# Patient Record
Sex: MALE | Race: WHITE | NOT HISPANIC OR LATINO
[De-identification: names, ages, dates, MRNs, and addresses within clinical notes are randomized per-mention and may not be internally consistent; named-entity substitution may affect disease eponyms.]

---
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
## 2019-06-07 ENCOUNTER — NON-APPOINTMENT (OUTPATIENT)
Age: 19
End: 2019-06-07

## 2019-06-07 ENCOUNTER — APPOINTMENT (OUTPATIENT)
Dept: CARDIOLOGY | Facility: CLINIC | Age: 19
End: 2019-06-07
Payer: COMMERCIAL

## 2019-06-07 VITALS
HEIGHT: 66 IN | SYSTOLIC BLOOD PRESSURE: 120 MMHG | OXYGEN SATURATION: 98 % | DIASTOLIC BLOOD PRESSURE: 90 MMHG | HEART RATE: 65 BPM | BODY MASS INDEX: 28.61 KG/M2 | WEIGHT: 178 LBS

## 2019-06-07 DIAGNOSIS — Z82.49 FAMILY HISTORY OF ISCHEMIC HEART DISEASE AND OTHER DISEASES OF THE CIRCULATORY SYSTEM: ICD-10-CM

## 2019-06-07 PROCEDURE — 99214 OFFICE O/P EST MOD 30 MIN: CPT

## 2019-06-07 PROCEDURE — 93000 ELECTROCARDIOGRAM COMPLETE: CPT | Mod: 59

## 2019-06-07 PROCEDURE — 0296T: CPT

## 2019-06-07 RX ORDER — METOPROLOL SUCCINATE 50 MG/1
50 TABLET, EXTENDED RELEASE ORAL
Qty: 90 | Refills: 3 | Status: ACTIVE | COMMUNITY
Start: 2019-06-07 | End: 1900-01-01

## 2019-06-07 NOTE — REASON FOR VISIT
[Follow-Up - Clinic] : a clinic follow-up of [Hypertension] : hypertension [Medication Management] : Medication management [Parent] : parent

## 2019-06-07 NOTE — PHYSICAL EXAM
[General Appearance - Well Developed] : well developed [Well Groomed] : well groomed [General Appearance - Well Nourished] : well nourished [Normal Appearance] : normal appearance [No Deformities] : no deformities [General Appearance - In No Acute Distress] : no acute distress [Normal Conjunctiva] : the conjunctiva exhibited no abnormalities [No Oral Pallor] : no oral pallor [Eyelids - No Xanthelasma] : the eyelids demonstrated no xanthelasmas [Normal Oral Mucosa] : normal oral mucosa [No Oral Cyanosis] : no oral cyanosis [Normal Jugular Venous A Waves Present] : normal jugular venous A waves present [Normal Jugular Venous V Waves Present] : normal jugular venous V waves present [Respiration, Rhythm And Depth] : normal respiratory rhythm and effort [No Jugular Venous Joshi A Waves] : no jugular venous joshi A waves [Heart Rate And Rhythm] : heart rate and rhythm were normal [Exaggerated Use Of Accessory Muscles For Inspiration] : no accessory muscle use [Auscultation Breath Sounds / Voice Sounds] : lungs were clear to auscultation bilaterally [Heart Sounds] : normal S1 and S2 [Murmurs] : no murmurs present [Abdomen Soft] : soft [Abdomen Tenderness] : non-tender [Abdomen Mass (___ Cm)] : no abdominal mass palpated [Nail Clubbing] : no clubbing of the fingernails [Abnormal Walk] : normal gait [Gait - Sufficient For Exercise Testing] : the gait was sufficient for exercise testing [Cyanosis, Localized] : no localized cyanosis [Petechial Hemorrhages (___cm)] : no petechial hemorrhages [Skin Color & Pigmentation] : normal skin color and pigmentation [] : no rash [No Venous Stasis] : no venous stasis [No Xanthoma] : no  xanthoma was observed [No Skin Ulcers] : no skin ulcer [Skin Lesions] : no skin lesions [Affect] : the affect was normal [Oriented To Time, Place, And Person] : oriented to person, place, and time [No Anxiety] : not feeling anxious [Mood] : the mood was normal

## 2019-06-07 NOTE — REVIEW OF SYSTEMS
[Dizziness] : dizziness [Negative] : Heme/Lymph [Headache] : headache [see HPI] : see HPI [Abdominal Pain] : abdominal pain

## 2019-06-10 NOTE — ASSESSMENT
[FreeTextEntry1] : \par \par Palpitations\par Zio patch will be applied to rule out dysrhythmia\par Blood work has been requested\par Followup echocardiogram\par Start beta blocker therapy. Instructed to call if adverse effect.\par \par Discussed the diagnosis, natural history and pathophysiology of HTN.\par Discussed importance of long term BP control to reduce CV risk\par Reviewed recent guidelines and BP goals\par No secondary causes of hypertension by blood work. \par Previously seen nephrology and optho, also involve endo\par Evaluate other comorbid risk factors (eg, obesity and dyslipidemia) for cardiovascular disease (CVD) \par Echocardiogram without hypertensive heart disease\par Normal blood pressure response to exercise. \par \par Discussed indications for pharmacotherapy.\par Continue lisinopril hydrochlorothiazide.  Monitor electrolytes and renal function\par Instructed to call if adverse effects\par Adjunctive dietary measures: regular aerobic exercise, low-sodium diet, limit NSAIDs\par Monitor blood pressure at home.   Call if abnormal BP readings\par \par Weight loss for long-term cardiovascular health.\par \par Mixed dyslipidemia. Low HDL elevated triglycerides elevated ALT presumed steatohepatitis. \par Lifestyle measures.

## 2019-06-10 NOTE — HISTORY OF PRESENT ILLNESS
[FreeTextEntry1] : ROWENA OSBORN  is a 18 year old  M\par There is a significant family history of hypertension and coronary artery disease.\par \par Presents for off to the office with symptoms of palpitations. He reports having panic attacks while in school. He remains active and plays club lacrosse and bikes. Over the past several days. He had symptoms of palpitations described as heart beating out of his chest. There is associated headache and abdominal pain. His blood pressure was elevated at home. There have been recurrent episodes, though less severe than when they initially started at night a week ago. The symptoms are intermittent in nature. \par \par There is no prior history of a clinical myocardial infarction, coronary revascularization. \par There is no history of symptomatic congestive heart failure rheumatic heart disease or valvular disease.\par There is no history of symptomatic arrhythmias including atrial fibrillation.\par \par There is no exertional chest pain, pressure or discomfort. \par There is no significant dyspnea on exertion or orthopnea. \par There is no syncope.\par \par Renal ultrasound. 2.18. No stenosis.\par Exercise tolerance test. March 2018. Exercised 11 minutes. Normal blood pressure response to exercise.  Test notable for tachycardia. \par Blood work 2.18 Hemoglobin 15.2, potassium 4.6, creatinine 0.9, triglycerides 298, HDL 36, LDL 47 A1c 5.66, TSH 4.2, magnesium 2.2, catecholamines within normal limits elevated reninn activity likely related to ACE inhibitor\par \par EKG demonstrates normal sinus rhythm, possible LVH. Early repolarization\par

## 2019-06-18 PROCEDURE — 0298T: CPT

## 2019-07-02 ENCOUNTER — APPOINTMENT (OUTPATIENT)
Dept: CARDIOLOGY | Facility: CLINIC | Age: 19
End: 2019-07-02
Payer: COMMERCIAL

## 2019-07-02 PROCEDURE — 93306 TTE W/DOPPLER COMPLETE: CPT

## 2019-07-08 ENCOUNTER — APPOINTMENT (OUTPATIENT)
Dept: CARDIOLOGY | Facility: CLINIC | Age: 19
End: 2019-07-08
Payer: COMMERCIAL

## 2019-07-08 VITALS
HEART RATE: 99 BPM | DIASTOLIC BLOOD PRESSURE: 88 MMHG | SYSTOLIC BLOOD PRESSURE: 132 MMHG | WEIGHT: 185 LBS | OXYGEN SATURATION: 97 % | BODY MASS INDEX: 29.73 KG/M2 | HEIGHT: 66 IN

## 2019-07-08 PROCEDURE — 99214 OFFICE O/P EST MOD 30 MIN: CPT

## 2019-07-08 NOTE — ASSESSMENT
[FreeTextEntry1] : \par Palpitations improved\par Recent monitor with no dysrhythmia. \par Hypertensive heart disease. Normal left ventricular function. \par Chronically elevated diastolic blood pressure. \par Significant family history of hypertension\par Increase lisinopril- HCTZ. Followup blood work to monitor electrolytes. \par Contnue beta blocker therapy. Instructed to call if adverse effect.\par \par Discussed the diagnosis, natural history and pathophysiology of HTN.\par Discussed importance of long term BP control to reduce CV risk\par Reviewed recent guidelines and BP goals\par No secondary causes of hypertension by blood work. \par Previously seen nephrology and optho, also involve endo\par Evaluate other comorbid risk factors (eg, obesity and dyslipidemia) for cardiovascular disease (CVD) \par \par Adjunctive dietary measures: regular aerobic exercise, low-sodium diet, limit NSAIDs\par Monitor blood pressure at home.   Call if abnormal BP readings\par \par Weight loss for long-term cardiovascular health.\par \par Mixed dyslipidemia. Low HDL elevated triglycerides elevated ALT presumed steatohepatitis. \par Lifestyle measures.

## 2019-07-08 NOTE — REVIEW OF SYSTEMS
[Headache] : headache [Abdominal Pain] : abdominal pain [see HPI] : see HPI [Dizziness] : dizziness [Negative] : Heme/Lymph

## 2019-07-08 NOTE — PHYSICAL EXAM
[General Appearance - Well Developed] : well developed [Normal Appearance] : normal appearance [General Appearance - Well Nourished] : well nourished [Well Groomed] : well groomed [No Deformities] : no deformities [Normal Conjunctiva] : the conjunctiva exhibited no abnormalities [General Appearance - In No Acute Distress] : no acute distress [Normal Oral Mucosa] : normal oral mucosa [No Oral Pallor] : no oral pallor [Eyelids - No Xanthelasma] : the eyelids demonstrated no xanthelasmas [No Oral Cyanosis] : no oral cyanosis [Normal Jugular Venous A Waves Present] : normal jugular venous A waves present [Normal Jugular Venous V Waves Present] : normal jugular venous V waves present [No Jugular Venous Joshi A Waves] : no jugular venous joshi A waves [Respiration, Rhythm And Depth] : normal respiratory rhythm and effort [Exaggerated Use Of Accessory Muscles For Inspiration] : no accessory muscle use [Auscultation Breath Sounds / Voice Sounds] : lungs were clear to auscultation bilaterally [Heart Rate And Rhythm] : heart rate and rhythm were normal [Murmurs] : no murmurs present [Abdomen Soft] : soft [Abdomen Tenderness] : non-tender [Heart Sounds] : normal S1 and S2 [Abdomen Mass (___ Cm)] : no abdominal mass palpated [Abnormal Walk] : normal gait [Nail Clubbing] : no clubbing of the fingernails [Gait - Sufficient For Exercise Testing] : the gait was sufficient for exercise testing [Cyanosis, Localized] : no localized cyanosis [Petechial Hemorrhages (___cm)] : no petechial hemorrhages [Skin Color & Pigmentation] : normal skin color and pigmentation [No Venous Stasis] : no venous stasis [Skin Lesions] : no skin lesions [] : no rash [No Skin Ulcers] : no skin ulcer [No Xanthoma] : no  xanthoma was observed [Oriented To Time, Place, And Person] : oriented to person, place, and time [Mood] : the mood was normal [Affect] : the affect was normal [No Anxiety] : not feeling anxious

## 2019-07-08 NOTE — HISTORY OF PRESENT ILLNESS
[FreeTextEntry1] : ROWENA OSBORN  is a 18 year old  M\par There is a significant family history of hypertension and coronary artery disease.\par \par Recent symptoms of recurrent intermittent palpitations, described as heart beating out of his chest. He reports having panic attacks while in school. He remains active and plays club lacrosse and bikesHis blood pressure was elevated at home \par \par Last visit, started beta blocker.  Echocardiogram, bood work was requested. \par There is clinical improvement with the metoprolol. \par \par There is no prior history of a clinical myocardial infarction, coronary revascularization. \par There is no history of symptomatic congestive heart failure rheumatic heart disease or valvular disease.\par There is no history of symptomatic arrhythmias including atrial fibrillation.\par \par There is no exertional chest pain, pressure or discomfort. \par There is no significant dyspnea on exertion or orthopnea. \par There is no syncope.\par \par Blood work, July 2019, potassium 4.3, creatinine 0.9, total cholesterol 117, HDL 43, LDL 52, TSH 1.9, hemoglobin 17.0, A1c 5.5. \par Echocardiogram demonstrates normal left ventricular function, mild left ventricular hypertrophy no significant valve  disease \par Monitor demonstrates average heart rate of 90 rare ectopy, sinus tachycardia \par Renal ultrasound. 2.18. No stenosis.\par Exercise tolerance test. March 2018. Exercised 11 minutes. Normal blood pressure response to exercise.  Test notable for tachycardia. \par Blood work 2.18 Hemoglobin 15.2, potassium 4.6, creatinine 0.9, triglycerides 298, HDL 36, LDL 47 A1c 5.66, TSH 4.2, magnesium 2.2, catecholamines within normal limits elevated reninn activity likely related to ACE inhibitor\par EKG demonstrates normal sinus rhythm, possible LVH. Early repolarization\par \par

## 2019-08-08 ENCOUNTER — APPOINTMENT (OUTPATIENT)
Dept: CARDIOLOGY | Facility: CLINIC | Age: 19
End: 2019-08-08

## 2019-08-12 ENCOUNTER — APPOINTMENT (OUTPATIENT)
Dept: CARDIOLOGY | Facility: CLINIC | Age: 19
End: 2019-08-12
Payer: COMMERCIAL

## 2019-08-12 VITALS
HEIGHT: 66 IN | DIASTOLIC BLOOD PRESSURE: 80 MMHG | BODY MASS INDEX: 30.53 KG/M2 | OXYGEN SATURATION: 98 % | WEIGHT: 190 LBS | HEART RATE: 80 BPM | SYSTOLIC BLOOD PRESSURE: 112 MMHG

## 2019-08-12 DIAGNOSIS — Z87.898 PERSONAL HISTORY OF OTHER SPECIFIED CONDITIONS: ICD-10-CM

## 2019-08-12 DIAGNOSIS — I10 ESSENTIAL (PRIMARY) HYPERTENSION: ICD-10-CM

## 2019-08-12 DIAGNOSIS — R00.2 PALPITATIONS: ICD-10-CM

## 2019-08-12 DIAGNOSIS — E66.3 OVERWEIGHT: ICD-10-CM

## 2019-08-12 DIAGNOSIS — E78.2 MIXED HYPERLIPIDEMIA: ICD-10-CM

## 2019-08-12 PROCEDURE — 99213 OFFICE O/P EST LOW 20 MIN: CPT

## 2019-08-12 NOTE — PHYSICAL EXAM
[General Appearance - Well Developed] : well developed [Normal Appearance] : normal appearance [Well Groomed] : well groomed [General Appearance - Well Nourished] : well nourished [No Deformities] : no deformities [Normal Conjunctiva] : the conjunctiva exhibited no abnormalities [General Appearance - In No Acute Distress] : no acute distress [Eyelids - No Xanthelasma] : the eyelids demonstrated no xanthelasmas [No Oral Pallor] : no oral pallor [No Oral Cyanosis] : no oral cyanosis [Normal Jugular Venous A Waves Present] : normal jugular venous A waves present [Normal Jugular Venous V Waves Present] : normal jugular venous V waves present [No Jugular Venous Joshi A Waves] : no jugular venous joshi A waves [Respiration, Rhythm And Depth] : normal respiratory rhythm and effort [Auscultation Breath Sounds / Voice Sounds] : lungs were clear to auscultation bilaterally [Exaggerated Use Of Accessory Muscles For Inspiration] : no accessory muscle use [Heart Rate And Rhythm] : heart rate and rhythm were normal [Heart Sounds] : normal S1 and S2 [Edema] : no peripheral edema present [Murmurs] : no murmurs present [Abdomen Soft] : soft [Abdomen Tenderness] : non-tender [Abnormal Walk] : normal gait [Abdomen Mass (___ Cm)] : no abdominal mass palpated [Gait - Sufficient For Exercise Testing] : the gait was sufficient for exercise testing [Nail Clubbing] : no clubbing of the fingernails [Cyanosis, Localized] : no localized cyanosis [Petechial Hemorrhages (___cm)] : no petechial hemorrhages [Skin Color & Pigmentation] : normal skin color and pigmentation [No Venous Stasis] : no venous stasis [] : no rash [Skin Lesions] : no skin lesions [No Skin Ulcers] : no skin ulcer [No Xanthoma] : no  xanthoma was observed [Oriented To Time, Place, And Person] : oriented to person, place, and time [Affect] : the affect was normal [Mood] : the mood was normal [No Anxiety] : not feeling anxious

## 2019-08-12 NOTE — HISTORY OF PRESENT ILLNESS
[FreeTextEntry1] : ROWENA OSBORN  is a 18 year old  M\par There is a significant family history of hypertension and coronary artery disease.\par \par Initially presented with symptoms of recurrent intermittent palpitations, described as heart beating out of his chest. He reports having panic attacks while in school. He remains active and plays club lacrosse and bikes. His blood pressure was elevated at home.\par \par He was started on beta blocker.  \par There is clinical improvement in his symptoms but persistent elevated DBP.\par Increased dose of ACE/diuretic combination, now readings are controlled at home and in office. \par \par There is no prior history of a clinical myocardial infarction, coronary revascularization. \par There is no history of symptomatic congestive heart failure rheumatic heart disease or valvular disease.\par There is no history of symptomatic arrhythmias including atrial fibrillation.\par \par There is no exertional chest pain, pressure or discomfort. \par There is no significant dyspnea on exertion or orthopnea. \par There is no syncope.\par \par Blood work, July 2019, potassium 4.3, creatinine 0.9, total cholesterol 117, HDL 43, LDL 52, TSH 1.9, hemoglobin 17.0, A1c 5.5. \par Echocardiogram demonstrates normal left ventricular function, mild left ventricular hypertrophy no significant valve  disease \par Monitor demonstrates average heart rate of 90 rare ectopy, sinus tachycardia \par Renal ultrasound. 2.18. No stenosis.\par Exercise tolerance test. March 2018. Exercised 11 minutes. Normal blood pressure response to exercise.  Test notable for tachycardia. \par Blood work 2.18 Hemoglobin 15.2, potassium 4.6, creatinine 0.9, triglycerides 298, HDL 36, LDL 47 A1c 5.66, TSH 4.2, magnesium 2.2, catecholamines within normal limits elevated reninn activity likely related to ACE inhibitor\par EKG demonstrates normal sinus rhythm, possible LVH. Early repolarization\par \par

## 2019-08-12 NOTE — ASSESSMENT
[FreeTextEntry1] : ROWENA OSBORN is a 18 year old M who presents today Aug 12, 2019 with the above history and the following active issues: \par \par Palpitations.\par Improved on beta blocker.\par Recent monitor with no dysrhythmia. \par Hypertensive heart disease. Normal left ventricular function. \par Chronically elevated diastolic blood pressure. \par Significant family history of hypertension\par Tolerating increased dose of lisinopril- HCTZ. Requested blood work to monitor electrolytes and renal fns, which has not yet been performed. \par Continue beta blocker therapy. Instructed to call if adverse effect.\par \par Discussed the diagnosis, natural history and pathophysiology of HTN.\par Discussed importance of long term BP control to reduce CV risk\par Reviewed recent guidelines and BP goals\par No secondary causes of hypertension by blood work. \par Previously seen nephrology and optho, also involve endo. Referral made today. \par Lifestyle modification to control comorbid risk factors (eg, obesity and dyslipidemia) for cardiovascular disease\par \par Adjunctive dietary measures: regular aerobic exercise, low-sodium diet, limit NSAIDs\par Monitor blood pressure at home.   Call if abnormal BP readings persistently >130/80\par Weight loss\par \par Mixed dyslipidemia. Low HDL elevated triglycerides elevated ALT presumed steatohepatitis. \par Lifestyle measures.\par \par F/U with PCP for further preventative care.\par F/U with our office annually for routine cardiovascular care unless otherwise indicated. \par Discussed red flag symptoms, which would warrant sooner or emergent medical evaluation.\par Any questions and concerns were addressed and resolved. \par \par Sincerely,\par \par MARISEL Lopez\par Patients history, testing, and plan reviewed with supervising MD: Dr. Dejan Wright

## 2020-03-18 ENCOUNTER — RX RENEWAL (OUTPATIENT)
Age: 20
End: 2020-03-18

## 2020-03-18 RX ORDER — LISINOPRIL AND HYDROCHLOROTHIAZIDE TABLETS 20; 25 MG/1; MG/1
20-25 TABLET ORAL DAILY
Qty: 180 | Refills: 3 | Status: ACTIVE | COMMUNITY
Start: 2017-12-28 | End: 1900-01-01

## 2020-08-11 ENCOUNTER — TRANSCRIPTION ENCOUNTER (OUTPATIENT)
Age: 20
End: 2020-08-11

## 2021-05-11 ENCOUNTER — APPOINTMENT (OUTPATIENT)
Dept: CARDIOLOGY | Facility: CLINIC | Age: 21
End: 2021-05-11

## 2021-10-06 PROBLEM — I10 ESSENTIAL HYPERTENSION: Status: ACTIVE | Noted: 2017-12-28

## 2022-07-11 ENCOUNTER — OUTPATIENT (OUTPATIENT)
Dept: OUTPATIENT SERVICES | Facility: HOSPITAL | Age: 22
LOS: 1 days | End: 2022-07-11

## 2022-07-11 DIAGNOSIS — Z11.52 ENCOUNTER FOR SCREENING FOR COVID-19: ICD-10-CM
